# Patient Record
Sex: FEMALE | Race: WHITE | URBAN - METROPOLITAN AREA
[De-identification: names, ages, dates, MRNs, and addresses within clinical notes are randomized per-mention and may not be internally consistent; named-entity substitution may affect disease eponyms.]

---

## 2017-01-09 ENCOUNTER — HISTORICAL (OUTPATIENT)
Dept: LAB | Facility: HOSPITAL | Age: 22
End: 2017-01-09

## 2018-03-12 ENCOUNTER — HISTORICAL (OUTPATIENT)
Dept: ADMINISTRATIVE | Facility: HOSPITAL | Age: 23
End: 2018-03-12

## 2018-05-09 ENCOUNTER — HISTORICAL (OUTPATIENT)
Dept: ADMINISTRATIVE | Facility: HOSPITAL | Age: 23
End: 2018-05-09

## 2018-07-18 ENCOUNTER — HOSPITAL ENCOUNTER (OUTPATIENT)
Dept: OBSTETRICS AND GYNECOLOGY | Facility: HOSPITAL | Age: 23
End: 2018-07-18
Attending: OBSTETRICS & GYNECOLOGY | Admitting: OBSTETRICS & GYNECOLOGY

## 2018-07-25 ENCOUNTER — HISTORICAL (OUTPATIENT)
Dept: LAB | Facility: HOSPITAL | Age: 23
End: 2018-07-25

## 2020-07-30 ENCOUNTER — HISTORICAL (OUTPATIENT)
Dept: ADMINISTRATIVE | Facility: HOSPITAL | Age: 25
End: 2020-07-30

## 2024-06-24 LAB
ABO AND RH: NORMAL
ANTIBODY SCREEN: NEGATIVE
C TRACH DNA SPEC QL NAA+PROBE: NEGATIVE
HBV SURFACE AG SERPL QL IA: NORMAL
HCV AB SERPL QL IA: NEGATIVE
HIV 1+2 AB+HIV1 P24 AG SERPL QL IA: NORMAL
N GONORRHOEA DNA SPEC QL NAA+PROBE: NEGATIVE
T PALLIDUM AB SER QL: NONREACTIVE
TREPONEMA PALLIDUM ANTIBODIES (IGG, IGM): NEGATIVE

## 2024-07-23 ENCOUNTER — HOSPITAL ENCOUNTER (OUTPATIENT)
Dept: RADIOLOGY | Facility: HOSPITAL | Age: 29
Discharge: HOME | End: 2024-07-23
Attending: INTERNAL MEDICINE
Payer: MEDICAID

## 2024-07-23 DIAGNOSIS — E04.1 THYROID NODULE: Primary | ICD-10-CM

## 2024-07-23 DIAGNOSIS — E04.1 THYROID NODULE: ICD-10-CM

## 2024-07-23 PROCEDURE — 76536 US EXAM OF HEAD AND NECK: CPT | Mod: TC

## 2024-09-09 ENCOUNTER — HOSPITAL ENCOUNTER (EMERGENCY)
Facility: HOSPITAL | Age: 29
Discharge: HOME | End: 2024-09-09
Attending: OBSTETRICS & GYNECOLOGY
Payer: MEDICAID

## 2024-09-09 VITALS
DIASTOLIC BLOOD PRESSURE: 75 MMHG | TEMPERATURE: 99 F | OXYGEN SATURATION: 98 % | BODY MASS INDEX: 24.55 KG/M2 | SYSTOLIC BLOOD PRESSURE: 122 MMHG | HEART RATE: 109 BPM | WEIGHT: 130 LBS | RESPIRATION RATE: 18 BRPM | HEIGHT: 61 IN

## 2024-09-09 PROCEDURE — 99284 EMERGENCY DEPT VISIT MOD MDM: CPT

## 2024-09-09 NOTE — ED PROVIDER NOTES
"SAUD NOTE     Admit Date: 2024  SAUD Physician: Leonid Granados  Primary OBGYN: Dr. Buster Knott    Admit Diagnosis/Chief Complaint: Abdominal Pain      Chief Complaint   Patient presents with    Abdominal Pain     Abdominal pain starting last night around 7:30pm. Pt states pain comes and goes.       Hospital Course:  Felicitas Davis is a 29 y.o.  at 33w3d presents complaining of contractions      Patient states has been complicated by GERD in this pregnancy.     Patient denies vaginal bleeding, leakage of fluid, and contractions.  Fetal Movement: normal.    /75   Pulse 109   Temp 98.6 °F (37 °C)   Resp 18   Ht 5' 1" (1.549 m)   Wt 59 kg (130 lb)   SpO2 98%   BMI 24.56 kg/m²   Temp:  [98.6 °F (37 °C)] 98.6 °F (37 °C)  Pulse:  [109-117] 109  Resp:  [18] 18  SpO2:  [98 %] 98 %  BP: (122-135)/(72-75) 122/75    General: in no apparent distress  Abdominal: soft, nontender, nondistended, no abnormal masses, no epigastric pain FHT present  Back: lumbar tenderness absent   CVA tenderness none  Extremeties no redness or tenderness in the calves or thighs no edema    SSE:   SVE:      FHT:  Reactive  TOCO: Contractions none      LABS:   No results found for this or any previous visit (from the past 24 hour(s)).  [unfilled]     Imaging Results    None          ASSESMENT: Felicitas Davis is a 29 y.o.   at 33w3d with Contractions  Observation in SAUD  Rule out labor  We will reevaluate cervix if [place]  If no contractions or cervical change, will discharge from hospital  Labor precautions reviewed with patient  ER precautions reviewed with patient  Patient was given an opportunity to ask questions  She is to follow-up with her primary care physician        Discharge Diagnosis/Clinical Impression**: Rule Out Labor, Contractions in Pregnancy, False Labor  Status:Stable    Patient Instructions:       - Pt was given routine pregnancy instructions including to " return to triage if she had any vaginal bleeding (other than spotting for the next 48hrs), any loss of fluid like her water broke, decreased fetal movement, or contractions Q 5min lasting for 2 or more hours. Pt was also instructed to drink copious water. Patient voiced understanding of all these instructions and was subsequently discharged home.    She will follow up with her primary OB.      This note was created with the assistance of [employer] voice recognition software. There may be transcription errors as a result of using this technology however minimal. Effort has been made to assure accuracy of transcription but any obvious errors or omissions should be clarified with the author of the document.

## 2024-10-02 LAB — STREP B PCR (OHS): NEGATIVE

## 2024-10-21 ENCOUNTER — ANESTHESIA (OUTPATIENT)
Dept: OBSTETRICS AND GYNECOLOGY | Facility: HOSPITAL | Age: 29
End: 2024-10-21
Payer: MEDICAID

## 2024-10-21 ENCOUNTER — HOSPITAL ENCOUNTER (INPATIENT)
Facility: HOSPITAL | Age: 29
LOS: 2 days | Discharge: HOME | End: 2024-10-23
Attending: OBSTETRICS & GYNECOLOGY | Admitting: OBSTETRICS & GYNECOLOGY
Payer: MEDICAID

## 2024-10-21 ENCOUNTER — ANESTHESIA EVENT (OUTPATIENT)
Dept: OBSTETRICS AND GYNECOLOGY | Facility: HOSPITAL | Age: 29
End: 2024-10-21
Payer: MEDICAID

## 2024-10-21 DIAGNOSIS — Z37.9 NORMAL LABOR: Primary | ICD-10-CM

## 2024-10-21 LAB
BASOPHILS # BLD AUTO: 0.06 X10(3)/MCL
BASOPHILS NFR BLD AUTO: 0.4 %
EOSINOPHIL # BLD AUTO: 0.22 X10(3)/MCL (ref 0–0.9)
EOSINOPHIL NFR BLD AUTO: 1.4 %
ERYTHROCYTE [DISTWIDTH] IN BLOOD BY AUTOMATED COUNT: 14.4 % (ref 11.5–17)
GROUP & RH: NORMAL
HCT VFR BLD AUTO: 38.3 % (ref 37–47)
HGB BLD-MCNC: 13.5 G/DL (ref 12–16)
IMM GRANULOCYTES # BLD AUTO: 0.07 X10(3)/MCL (ref 0–0.04)
IMM GRANULOCYTES NFR BLD AUTO: 0.4 %
INDIRECT COOMBS: NORMAL
LYMPHOCYTES # BLD AUTO: 3.2 X10(3)/MCL (ref 0.6–4.6)
LYMPHOCYTES NFR BLD AUTO: 20.1 %
MCH RBC QN AUTO: 30.5 PG (ref 27–31)
MCHC RBC AUTO-ENTMCNC: 35.2 G/DL (ref 33–36)
MCV RBC AUTO: 86.7 FL (ref 80–94)
MONOCYTES # BLD AUTO: 0.94 X10(3)/MCL (ref 0.1–1.3)
MONOCYTES NFR BLD AUTO: 5.9 %
NEUTROPHILS # BLD AUTO: 11.43 X10(3)/MCL (ref 2.1–9.2)
NEUTROPHILS NFR BLD AUTO: 71.8 %
NRBC BLD AUTO-RTO: 0 %
PLATELET # BLD AUTO: 223 X10(3)/MCL (ref 130–400)
PMV BLD AUTO: 10.7 FL (ref 7.4–10.4)
RBC # BLD AUTO: 4.42 X10(6)/MCL (ref 4.2–5.4)
SPECIMEN OUTDATE: NORMAL
T PALLIDUM AB SER QL: NONREACTIVE
WBC # BLD AUTO: 15.92 X10(3)/MCL (ref 4.5–11.5)

## 2024-10-21 PROCEDURE — 99285 EMERGENCY DEPT VISIT HI MDM: CPT | Mod: 25

## 2024-10-21 PROCEDURE — 85025 COMPLETE CBC W/AUTO DIFF WBC: CPT | Performed by: OBSTETRICS & GYNECOLOGY

## 2024-10-21 PROCEDURE — 37000008 HC ANESTHESIA 1ST 15 MINUTES: Performed by: ANESTHESIOLOGY

## 2024-10-21 PROCEDURE — 51702 INSERT TEMP BLADDER CATH: CPT

## 2024-10-21 PROCEDURE — 72100002 HC LABOR CARE, 1ST 8 HOURS

## 2024-10-21 PROCEDURE — 86850 RBC ANTIBODY SCREEN: CPT | Performed by: OBSTETRICS & GYNECOLOGY

## 2024-10-21 PROCEDURE — 63600175 PHARM REV CODE 636 W HCPCS: Mod: JG

## 2024-10-21 PROCEDURE — 86780 TREPONEMA PALLIDUM: CPT | Performed by: OBSTETRICS & GYNECOLOGY

## 2024-10-21 PROCEDURE — 11000001 HC ACUTE MED/SURG PRIVATE ROOM

## 2024-10-21 PROCEDURE — 25000003 PHARM REV CODE 250

## 2024-10-21 PROCEDURE — 63600175 PHARM REV CODE 636 W HCPCS

## 2024-10-21 PROCEDURE — 62326 NJX INTERLAMINAR LMBR/SAC: CPT

## 2024-10-21 PROCEDURE — 72200005 HC VAGINAL DELIVERY LEVEL II

## 2024-10-21 PROCEDURE — 63600175 PHARM REV CODE 636 W HCPCS: Performed by: OBSTETRICS & GYNECOLOGY

## 2024-10-21 PROCEDURE — 37000009 HC ANESTHESIA EA ADD 15 MINS: Performed by: ANESTHESIOLOGY

## 2024-10-21 RX ORDER — SIMETHICONE 80 MG
1 TABLET,CHEWABLE ORAL EVERY 6 HOURS PRN
Status: DISCONTINUED | OUTPATIENT
Start: 2024-10-21 | End: 2024-10-23 | Stop reason: HOSPADM

## 2024-10-21 RX ORDER — DIPHENOXYLATE HYDROCHLORIDE AND ATROPINE SULFATE 2.5; .025 MG/1; MG/1
2 TABLET ORAL EVERY 6 HOURS PRN
Status: DISCONTINUED | OUTPATIENT
Start: 2024-10-21 | End: 2024-10-23 | Stop reason: HOSPADM

## 2024-10-21 RX ORDER — OXYTOCIN 10 [USP'U]/ML
10 INJECTION, SOLUTION INTRAMUSCULAR; INTRAVENOUS ONCE AS NEEDED
Status: DISCONTINUED | OUTPATIENT
Start: 2024-10-21 | End: 2024-10-23 | Stop reason: HOSPADM

## 2024-10-21 RX ORDER — FENTANYL/BUPIVACAINE/NS/PF 2-1250MCG
PLASTIC BAG, INJECTION (ML) INJECTION CONTINUOUS
Status: DISCONTINUED | OUTPATIENT
Start: 2024-10-21 | End: 2024-10-23 | Stop reason: HOSPADM

## 2024-10-21 RX ORDER — OXYTOCIN-SODIUM CHLORIDE 0.9% IV SOLN 30 UNIT/500ML 30-0.9/5 UT/ML-%
95 SOLUTION INTRAVENOUS CONTINUOUS PRN
Status: DISCONTINUED | OUTPATIENT
Start: 2024-10-21 | End: 2024-10-22

## 2024-10-21 RX ORDER — LIDOCAINE HYDROCHLORIDE AND EPINEPHRINE 15; 5 MG/ML; UG/ML
INJECTION, SOLUTION EPIDURAL
Status: DISCONTINUED | OUTPATIENT
Start: 2024-10-21 | End: 2024-10-21

## 2024-10-21 RX ORDER — MISOPROSTOL 100 UG/1
800 TABLET ORAL ONCE AS NEEDED
Status: DISCONTINUED | OUTPATIENT
Start: 2024-10-21 | End: 2024-10-23 | Stop reason: HOSPADM

## 2024-10-21 RX ORDER — SODIUM CHLORIDE, SODIUM LACTATE, POTASSIUM CHLORIDE, CALCIUM CHLORIDE 600; 310; 30; 20 MG/100ML; MG/100ML; MG/100ML; MG/100ML
INJECTION, SOLUTION INTRAVENOUS CONTINUOUS
Status: DISCONTINUED | OUTPATIENT
Start: 2024-10-21 | End: 2024-10-21

## 2024-10-21 RX ORDER — NALOXONE HCL 0.4 MG/ML
0.4 VIAL (ML) INJECTION SEE ADMIN INSTRUCTIONS
Status: DISCONTINUED | OUTPATIENT
Start: 2024-10-21 | End: 2024-10-22

## 2024-10-21 RX ORDER — ONDANSETRON 4 MG/1
8 TABLET, ORALLY DISINTEGRATING ORAL EVERY 8 HOURS PRN
Status: DISCONTINUED | OUTPATIENT
Start: 2024-10-21 | End: 2024-10-21

## 2024-10-21 RX ORDER — PRENATAL WITH FERROUS FUM AND FOLIC ACID 3080; 920; 120; 400; 22; 1.84; 3; 20; 10; 1; 12; 200; 27; 25; 2 [IU]/1; [IU]/1; MG/1; [IU]/1; MG/1; MG/1; MG/1; MG/1; MG/1; MG/1; UG/1; MG/1; MG/1; MG/1; MG/1
1 TABLET ORAL DAILY
Status: DISCONTINUED | OUTPATIENT
Start: 2024-10-22 | End: 2024-10-23 | Stop reason: HOSPADM

## 2024-10-21 RX ORDER — CARBOPROST TROMETHAMINE 250 UG/ML
250 INJECTION, SOLUTION INTRAMUSCULAR
Status: DISCONTINUED | OUTPATIENT
Start: 2024-10-21 | End: 2024-10-23 | Stop reason: HOSPADM

## 2024-10-21 RX ORDER — BUPIVACAINE HYDROCHLORIDE 2.5 MG/ML
INJECTION, SOLUTION INFILTRATION; PERINEURAL
Status: DISCONTINUED | OUTPATIENT
Start: 2024-10-21 | End: 2024-10-21

## 2024-10-21 RX ORDER — HYDROCORTISONE 25 MG/G
CREAM TOPICAL 3 TIMES DAILY PRN
Status: DISCONTINUED | OUTPATIENT
Start: 2024-10-21 | End: 2024-10-23 | Stop reason: HOSPADM

## 2024-10-21 RX ORDER — FENTANYL/BUPIVACAINE/NS/PF 2-1250MCG
PLASTIC BAG, INJECTION (ML) INJECTION CONTINUOUS PRN
Status: DISCONTINUED | OUTPATIENT
Start: 2024-10-21 | End: 2024-10-21

## 2024-10-21 RX ORDER — OXYTOCIN-SODIUM CHLORIDE 0.9% IV SOLN 30 UNIT/500ML 30-0.9/5 UT/ML-%
95 SOLUTION INTRAVENOUS ONCE AS NEEDED
Status: DISCONTINUED | OUTPATIENT
Start: 2024-10-21 | End: 2024-10-23 | Stop reason: HOSPADM

## 2024-10-21 RX ORDER — OXYTOCIN-SODIUM CHLORIDE 0.9% IV SOLN 30 UNIT/500ML 30-0.9/5 UT/ML-%
95 SOLUTION INTRAVENOUS ONCE AS NEEDED
Status: DISCONTINUED | OUTPATIENT
Start: 2024-10-21 | End: 2024-10-21

## 2024-10-21 RX ORDER — SODIUM CITRATE AND CITRIC ACID MONOHYDRATE 334; 500 MG/5ML; MG/5ML
30 SOLUTION ORAL ONCE
OUTPATIENT
Start: 2024-10-21 | End: 2024-10-21

## 2024-10-21 RX ORDER — FENTANYL CITRATE 50 UG/ML
INJECTION, SOLUTION INTRAMUSCULAR; INTRAVENOUS
Status: DISCONTINUED | OUTPATIENT
Start: 2024-10-21 | End: 2024-10-21

## 2024-10-21 RX ORDER — FAMOTIDINE 10 MG/ML
20 INJECTION INTRAVENOUS
Status: DISCONTINUED | OUTPATIENT
Start: 2024-10-21 | End: 2024-10-21

## 2024-10-21 RX ORDER — EPHEDRINE SULFATE 50 MG/ML
10 INJECTION, SOLUTION INTRAVENOUS
OUTPATIENT
Start: 2024-10-21 | End: 2024-10-21

## 2024-10-21 RX ORDER — METHYLERGONOVINE MALEATE 0.2 MG/ML
200 INJECTION INTRAVENOUS ONCE AS NEEDED
Status: DISCONTINUED | OUTPATIENT
Start: 2024-10-21 | End: 2024-10-21

## 2024-10-21 RX ORDER — CALCIUM CARBONATE 200(500)MG
500 TABLET,CHEWABLE ORAL 3 TIMES DAILY PRN
Status: DISCONTINUED | OUTPATIENT
Start: 2024-10-21 | End: 2024-10-23 | Stop reason: HOSPADM

## 2024-10-21 RX ORDER — EPHEDRINE SULFATE 50 MG/ML
10 INJECTION, SOLUTION INTRAVENOUS EVERY 5 MIN PRN
Status: DISCONTINUED | OUTPATIENT
Start: 2024-10-21 | End: 2024-10-22

## 2024-10-21 RX ORDER — OXYTOCIN-SODIUM CHLORIDE 0.9% IV SOLN 30 UNIT/500ML 30-0.9/5 UT/ML-%
95 SOLUTION INTRAVENOUS CONTINUOUS PRN
Status: DISCONTINUED | OUTPATIENT
Start: 2024-10-21 | End: 2024-10-23 | Stop reason: HOSPADM

## 2024-10-21 RX ORDER — ACETAMINOPHEN 325 MG/1
650 TABLET ORAL EVERY 6 HOURS SCHEDULED
Status: DISCONTINUED | OUTPATIENT
Start: 2024-10-22 | End: 2024-10-23 | Stop reason: HOSPADM

## 2024-10-21 RX ORDER — PROPRANOLOL HYDROCHLORIDE 60 MG/1
60 TABLET ORAL
COMMUNITY
Start: 2024-10-01

## 2024-10-21 RX ORDER — DOCUSATE SODIUM 100 MG/1
200 CAPSULE, LIQUID FILLED ORAL 2 TIMES DAILY PRN
Status: DISCONTINUED | OUTPATIENT
Start: 2024-10-21 | End: 2024-10-23 | Stop reason: HOSPADM

## 2024-10-21 RX ORDER — SIMETHICONE 80 MG
1 TABLET,CHEWABLE ORAL 4 TIMES DAILY PRN
Status: DISCONTINUED | OUTPATIENT
Start: 2024-10-21 | End: 2024-10-21

## 2024-10-21 RX ORDER — MISOPROSTOL 100 UG/1
800 TABLET ORAL ONCE AS NEEDED
Status: DISCONTINUED | OUTPATIENT
Start: 2024-10-21 | End: 2024-10-21

## 2024-10-21 RX ORDER — LIDOCAINE HYDROCHLORIDE 10 MG/ML
10 INJECTION, SOLUTION INFILTRATION; PERINEURAL ONCE AS NEEDED
Status: DISCONTINUED | OUTPATIENT
Start: 2024-10-21 | End: 2024-10-21

## 2024-10-21 RX ORDER — SODIUM CHLORIDE 0.9 % (FLUSH) 0.9 %
10 SYRINGE (ML) INJECTION
Status: DISCONTINUED | OUTPATIENT
Start: 2024-10-21 | End: 2024-10-23 | Stop reason: HOSPADM

## 2024-10-21 RX ORDER — SODIUM CITRATE AND CITRIC ACID MONOHYDRATE 334; 500 MG/5ML; MG/5ML
30 SOLUTION ORAL
Status: DISCONTINUED | OUTPATIENT
Start: 2024-10-21 | End: 2024-10-21

## 2024-10-21 RX ORDER — OXYTOCIN-SODIUM CHLORIDE 0.9% IV SOLN 30 UNIT/500ML 30-0.9/5 UT/ML-%
10 SOLUTION INTRAVENOUS ONCE AS NEEDED
Status: DISCONTINUED | OUTPATIENT
Start: 2024-10-21 | End: 2024-10-21

## 2024-10-21 RX ORDER — CARBOPROST TROMETHAMINE 250 UG/ML
250 INJECTION, SOLUTION INTRAMUSCULAR
Status: DISCONTINUED | OUTPATIENT
Start: 2024-10-21 | End: 2024-10-21

## 2024-10-21 RX ORDER — OXYTOCIN-SODIUM CHLORIDE 0.9% IV SOLN 30 UNIT/500ML 30-0.9/5 UT/ML-%
30 SOLUTION INTRAVENOUS ONCE AS NEEDED
Status: DISCONTINUED | OUTPATIENT
Start: 2024-10-21 | End: 2024-10-21

## 2024-10-21 RX ORDER — METHYLERGONOVINE MALEATE 0.2 MG/ML
200 INJECTION INTRAVENOUS ONCE AS NEEDED
Status: DISCONTINUED | OUTPATIENT
Start: 2024-10-21 | End: 2024-10-23 | Stop reason: HOSPADM

## 2024-10-21 RX ORDER — MUPIROCIN 20 MG/G
OINTMENT TOPICAL
OUTPATIENT
Start: 2024-10-21

## 2024-10-21 RX ORDER — DIPHENHYDRAMINE HYDROCHLORIDE 50 MG/ML
25 INJECTION INTRAMUSCULAR; INTRAVENOUS EVERY 4 HOURS PRN
Status: DISCONTINUED | OUTPATIENT
Start: 2024-10-21 | End: 2024-10-23 | Stop reason: HOSPADM

## 2024-10-21 RX ORDER — DIPHENOXYLATE HYDROCHLORIDE AND ATROPINE SULFATE 2.5; .025 MG/1; MG/1
2 TABLET ORAL EVERY 6 HOURS PRN
Status: DISCONTINUED | OUTPATIENT
Start: 2024-10-21 | End: 2024-10-21

## 2024-10-21 RX ORDER — OXYTOCIN-SODIUM CHLORIDE 0.9% IV SOLN 30 UNIT/500ML 30-0.9/5 UT/ML-%
10 SOLUTION INTRAVENOUS ONCE AS NEEDED
OUTPATIENT
Start: 2024-10-21 | End: 2036-03-19

## 2024-10-21 RX ORDER — ONDANSETRON 4 MG/1
8 TABLET, ORALLY DISINTEGRATING ORAL EVERY 8 HOURS PRN
Status: DISCONTINUED | OUTPATIENT
Start: 2024-10-21 | End: 2024-10-23 | Stop reason: HOSPADM

## 2024-10-21 RX ORDER — EPHEDRINE SULFATE 50 MG/ML
25 INJECTION, SOLUTION INTRAVENOUS EVERY 30 MIN PRN
Status: DISCONTINUED | OUTPATIENT
Start: 2024-10-21 | End: 2024-10-23 | Stop reason: HOSPADM

## 2024-10-21 RX ORDER — FENTANYL/BUPIVACAINE/NS/PF 2-1250MCG
PLASTIC BAG, INJECTION (ML) INJECTION CONTINUOUS
Status: DISCONTINUED | OUTPATIENT
Start: 2024-10-21 | End: 2024-10-21

## 2024-10-21 RX ORDER — DIPHENHYDRAMINE HCL 25 MG
25 CAPSULE ORAL EVERY 4 HOURS PRN
Status: DISCONTINUED | OUTPATIENT
Start: 2024-10-21 | End: 2024-10-23 | Stop reason: HOSPADM

## 2024-10-21 RX ORDER — OXYTOCIN 10 [USP'U]/ML
10 INJECTION, SOLUTION INTRAMUSCULAR; INTRAVENOUS ONCE AS NEEDED
Status: COMPLETED | OUTPATIENT
Start: 2024-10-21 | End: 2024-10-21

## 2024-10-21 RX ORDER — DIPHENHYDRAMINE HYDROCHLORIDE 50 MG/ML
12.5 INJECTION INTRAMUSCULAR; INTRAVENOUS EVERY 4 HOURS PRN
Status: DISCONTINUED | OUTPATIENT
Start: 2024-10-21 | End: 2024-10-21

## 2024-10-21 RX ORDER — ONDANSETRON HYDROCHLORIDE 2 MG/ML
4 INJECTION, SOLUTION INTRAVENOUS EVERY 4 HOURS PRN
Status: DISCONTINUED | OUTPATIENT
Start: 2024-10-21 | End: 2024-10-22

## 2024-10-21 RX ADMIN — ACETAMINOPHEN 650 MG: 325 TABLET, FILM COATED ORAL at 10:10

## 2024-10-21 RX ADMIN — FENTANYL CITRATE 100 MCG: 50 INJECTION, SOLUTION INTRAMUSCULAR; INTRAVENOUS at 07:10

## 2024-10-21 RX ADMIN — OXYTOCIN 10 UNITS: 10 INJECTION, SOLUTION INTRAMUSCULAR; INTRAVENOUS at 08:10

## 2024-10-21 RX ADMIN — LIDOCAINE HYDROCHLORIDE,EPINEPHRINE BITARTRATE 3 ML: 15; .005 INJECTION, SOLUTION EPIDURAL; INFILTRATION; INTRACAUDAL; PERINEURAL at 07:10

## 2024-10-21 RX ADMIN — SODIUM CHLORIDE, POTASSIUM CHLORIDE, SODIUM LACTATE AND CALCIUM CHLORIDE 500 ML: 600; 310; 30; 20 INJECTION, SOLUTION INTRAVENOUS at 07:10

## 2024-10-21 RX ADMIN — BUPIVACAINE HYDROCHLORIDE 4 ML: 2.5 INJECTION, SOLUTION INFILTRATION; PERINEURAL at 07:10

## 2024-10-21 RX ADMIN — Medication 12 ML/HR: at 07:10

## 2024-10-21 NOTE — ANESTHESIA PREPROCEDURE EVALUATION
"[facility] - Labor and Delivery  Anesthesiology  Labor Epidural    Patient Name: Felicitas Davis  MRN: [id number]  Admission Date: 10/21/2024  Primary Care Provider: Shirley, Primary Doctor  Attending Physician: Buster Knott Jr., MD    Subjective:     Patient in labor, requesting epidural.    OB History    Para Term  AB Living   4 3 3 0 0 3   SAB IAB Ectopic Multiple Live Births   0 0 0 0 3      # Outcome Date GA Lbr Fareed/2nd Weight Sex Type Anes PTL Lv   4 Current            3 Term 18 39w0d    Vag-Spont   NATHAN   2 Term 03/18/15 41w0d    Vag-Spont   NATHAN   1 Term 12 40w0d    Vag-Spont   NATHAN     History reviewed. No pertinent past medical history.  History reviewed. No pertinent surgical history.    PTA Medications   Medication Sig    propranoloL (INDERAL) 60 MG tablet Take 60 mg by mouth.    prenatal vit/iron fum/folic ac (PRENATAL 1+1 ORAL) Take by mouth.       Review of patient's allergies indicates:   Allergen Reactions    Codeine Hives        Tobacco Use    Smoking status: Never    Smokeless tobacco: Never   Substance and Sexual Activity    Alcohol use: Not Currently    Drug use: Never    Sexual activity: Not on file     Review of Systems   Objective:     Vital Signs (Most Recent):  Pulse: 79 (10/21/24 183)  Resp: 18 (10/21/24 1837)  BP: 115/73 (10/21/24 1837)  SpO2: 100 % (10/21/24 1837) Vital Signs (24h Range):  Pulse:  [79] 79  Resp:  [18] 18  SpO2:  [100 %] 100 %  BP: (115)/(73) 115/73     Weight: 59.9 kg (132 lb)  Body mass index is 24.94 kg/m².    Significant Labs:  No results found for: "WBC", "HGB", "HCT", "MCV", "PLT"    Assessment/Plan:     29 y.o. female  at 39w3d for: Labor epidural    Yoshi Reynoso, LONNIE  Anesthesiology  [facility] - Labor and Delivery         Pre-op Assessment    I have reviewed the Patient Summary Reports.     I have reviewed the Nursing Notes. I have reviewed the NPO Status.   I have reviewed the Medications. "     Review of Systems  Anesthesia Hx:             Denies Family Hx of Anesthesia complications.    Denies Personal Hx of Anesthesia complications.                    Hematology/Oncology:  Hematology Normal   Oncology Normal                                   EENT/Dental:  EENT/Dental Normal           Cardiovascular:  Cardiovascular Normal                                              Pulmonary:  Pulmonary Normal                       Renal/:  Renal/ Normal                 Hepatic/GI:  Hepatic/GI Normal                    Musculoskeletal:  Musculoskeletal Normal                Neurological:  Neurology Normal                                      Endocrine:  Endocrine Normal            Dermatological:  Skin Normal    Psych:  Psychiatric Normal                    Physical Exam  General: Well nourished, Cooperative, Oriented and Alert    Airway:  Mallampati: II   Mouth Opening: Normal  TM Distance: Normal  Tongue: Normal  Neck ROM: Normal ROM        Anesthesia Plan  Type of Anesthesia, risks & benefits discussed:    Anesthesia Type: Epidural  Informed Consent: Informed consent signed with the Patient and all parties understand the risks and agree with anesthesia plan.  All questions answered.   ASA Score: 2  Day of Surgery Review of History & Physical: H&P Update referred to the surgeon/provider.    Ready For Surgery From Anesthesia Perspective.     .

## 2024-10-21 NOTE — ED PROVIDER NOTES
Encounter Date: 10/21/2024       History     Chief Complaint   Patient presents with    Contractions      IUP @ 39.3wks, c/o irregular ctx, more frequent the last hour. Pt denies LOF, vaginal bleeding. +FM. RAUL Knott is primary OB     HPI  Review of patient's allergies indicates:   Allergen Reactions    Codeine Hives     History reviewed. No pertinent past medical history.  History reviewed. No pertinent surgical history.  No family history on file.  Social History     Tobacco Use    Smoking status: Never    Smokeless tobacco: Never   Substance Use Topics    Alcohol use: Not Currently    Drug use: Never     Review of Systems    Physical Exam     Initial Vitals [10/21/24 1837]   BP Pulse Resp Temp SpO2   115/73 79 18 -- --      MAP       --         Physical Exam    ED Course   Procedures  Labs Reviewed - No data to display       Imaging Results    None          Medications - No data to display  Medical Decision Making                                    Clinical Impression:   This  @ 38 + weeks gestation presents with complaints of pain and contractions since 4pm. Reports +FM, no LOF, no UB, +CTX  Denies any complications this pregnancy  And no pertinent medical history    Tracing: cat 2 with mild variables with full recovery lasting less than 30sec    VE: /-2    A/P: Labor, progressing, category 2 tracing  -admit  -pain management  -IV fluids  -labs  -discussed with Dr. Knott      A/P:           Rafael Palomino MD  10/21/24 2539

## 2024-10-22 ENCOUNTER — ANESTHESIA EVENT (OUTPATIENT)
Dept: OBSTETRICS AND GYNECOLOGY | Facility: HOSPITAL | Age: 29
End: 2024-10-22

## 2024-10-22 ENCOUNTER — ANESTHESIA (OUTPATIENT)
Dept: OBSTETRICS AND GYNECOLOGY | Facility: HOSPITAL | Age: 29
End: 2024-10-22

## 2024-10-22 PROCEDURE — 72200006 HC VAGINAL DELIVERY LEVEL III

## 2024-10-22 PROCEDURE — 11000001 HC ACUTE MED/SURG PRIVATE ROOM

## 2024-10-22 PROCEDURE — 25000003 PHARM REV CODE 250: Performed by: OBSTETRICS & GYNECOLOGY

## 2024-10-22 RX ORDER — OXYCODONE AND ACETAMINOPHEN 5; 325 MG/1; MG/1
1 TABLET ORAL EVERY 6 HOURS PRN
Status: DISCONTINUED | OUTPATIENT
Start: 2024-10-22 | End: 2024-10-23 | Stop reason: HOSPADM

## 2024-10-22 RX ORDER — IBUPROFEN 600 MG/1
600 TABLET ORAL EVERY 6 HOURS SCHEDULED
Status: DISCONTINUED | OUTPATIENT
Start: 2024-10-22 | End: 2024-10-23 | Stop reason: HOSPADM

## 2024-10-22 RX ORDER — OXYCODONE AND ACETAMINOPHEN 5; 325 MG/1; MG/1
1 TABLET ORAL EVERY 6 HOURS PRN
Qty: 20 TABLET | Refills: 0 | Status: SHIPPED | OUTPATIENT
Start: 2024-10-22

## 2024-10-22 RX ADMIN — OXYCODONE HYDROCHLORIDE AND ACETAMINOPHEN 1 TABLET: 5; 325 TABLET ORAL at 03:10

## 2024-10-22 RX ADMIN — OXYCODONE HYDROCHLORIDE AND ACETAMINOPHEN 1 TABLET: 5; 325 TABLET ORAL at 10:10

## 2024-10-22 RX ADMIN — DOCUSATE SODIUM 200 MG: 100 CAPSULE, LIQUID FILLED ORAL at 07:10

## 2024-10-22 RX ADMIN — OXYCODONE HYDROCHLORIDE AND ACETAMINOPHEN 1 TABLET: 5; 325 TABLET ORAL at 09:10

## 2024-10-22 RX ADMIN — PRENATAL VITAMINS-IRON FUMARATE 27 MG IRON-FOLIC ACID 0.8 MG TABLET 1 TABLET: at 07:10

## 2024-10-22 RX ADMIN — IBUPROFEN 600 MG: 600 TABLET, FILM COATED ORAL at 07:10

## 2024-10-22 RX ADMIN — IBUPROFEN 600 MG: 600 TABLET, FILM COATED ORAL at 01:10

## 2024-10-22 RX ADMIN — ACETAMINOPHEN 650 MG: 325 TABLET, FILM COATED ORAL at 03:10

## 2024-10-22 NOTE — ANESTHESIA PROCEDURE NOTES
Epidural    Patient location during procedure: OB   Reason for block: primary anesthetic   Reason for block: labor analgesia requested by patient and obstetrician  Diagnosis: Labor pain relief   Start time: 10/21/2024 7:20 PM  Timeout: 10/21/2024 7:17 PM  End time: 10/21/2024 7:27 PM    Staffing  Performing Provider: Yoshi Reynoso CRNA  Authorizing Provider: Hoang Mcmullen MD    Staffing  Performed by: Yoshi Reynoso CRNA  Authorized by: Hoang Mcmullen MD        Preanesthetic Checklist  Completed: patient identified, IV checked, site marked, risks and benefits discussed, surgical consent, monitors and equipment checked, pre-op evaluation, timeout performed, anesthesia consent given, hand hygiene performed and patient being monitored  Preparation  Patient position: sitting (Mask and sterile gloves worn)  Prep: ChloraPrep and Pt preloaded with 500 to 1000ml of crystalloid  Patient monitoring: Pulse Ox (Pre & Post procedure vitals & FHR are recorded by the nurse q5mins as appropriate)  Reason for block: primary anesthetic   Epidural  Skin Anesthetic: lidocaine 1% (25G 1.5inch needle)  Skin Wheal: 3 mL  Administration type: continuous  Approach: midline  Interspace: L3-4    Injection technique: GREGORY saline  Needle and Epidural Catheter  Needle type: Tuohy   Needle gauge: 17  Needle length: 3.5 inches  Needle insertion depth: 4.5 cm  Catheter type: springwound and multi-orifice  Catheter size: 19 G  Catheter at skin depth: 13 cm  Insertion Attempts: 1  Test dose: 3 mL of lidocaine 1.5% with Epi 1-to-200,000 (Test dose given via epidural catheter)  Additional Documentation: negative aspiration for heme and CSF, no paresthesia on injection, no significant pain on injection, incremental injection, no signs/symptoms of IV or SA injection and no significant complaints from patient (Epidural catheter connected to epidural pump with filter in situ and pt instructed on its use.  Warning label placed on epidural  catheter.)  Needle localization: anatomical landmarks  Assessment  Ease of block: easy  Patient's tolerance of the procedure: comfortable throughout block (Pt returned to supine position with head of bed elevated 30 degrees and HECTOR applied as needed)  Additional Notes  Pt instructed in use of epidural PCA  Improvement in pain relief documented by L&D nurses  L&D nurses instructed to call if no relief at all after 30 mins No inadvertent dural puncture with Tuohy.  Dural puncture not performed with spinal needle

## 2024-10-22 NOTE — OP NOTE
[facility]                       [address] [address]    PATIENT NAME:      LUCIE CHILDS  YOB: 1995  CSN:               [id number]  MRN:               [id number]  ADMIT DATE:        10/21/2024 18:30:00  PHYSICIAN:         Buster Knott Jr, MD                          OPERATIVE REPORT      DATE OF SURGERY:    10/21/2024 00:00:00    SURGEON:  Buster Knott Jr, MD    TYPE OF DELIVERY:  Spontaneous vaginal delivery.    PROCEDURE IN DETAIL:  A 29-year-old female admitted to the obstetric unit in   active labor.  Upon arrival, the patient is noted to be 4-5 cm.  She had   artificial rupture of membranes, clear fluid.  Received epidural anesthesia and   progressed to complete cervical dilatation with maternal contraction and   Valsalva.  The infant's head was delivered without incident.  Anterior shoulder   was delivered.  Rest of the infant delivered in full.  The cord was clamped and   cut.  Appropriate cord gases and cord blood were obtained.  The infant was   handed off to awaiting [facility] nurses who were at the bedside at the time of   delivery secondary to meconium-stained fluid.  The placenta was delivered   spontaneously.  IV Pitocin drip was begun.  Uterus was massaged and noted to be   firm at the umbilicus.  A vaginal and cervical inspection revealed no   lacerations or tears.  Apgars, weight pending.    BLOOD LOSS:  340.        ______________________________  Buster Knott Jr, MD    DJE/AQS  DD:  10/22/2024  Time:  08:06AM  DT:  10/22/2024  Time:  08:33AM  Job #:  [id number]      OPERATIVE REPORT

## 2024-10-22 NOTE — LACTATION NOTE
This note was copied from a baby's chart.  Mom is exclusively pumping for baby. Assisted mom with pumping. Reviewed use of pump, cleaning kit, milk storage and handling. Encouraged pumping 8 or more times per 24 hrs with at least one time during the night. Explained supply/demand of milk production. Mom was shown hand expression and expressed 0.3mls.     Answered moms questions. Offered further assistance if needed. Verbalized understanding of all.

## 2024-10-22 NOTE — H&P
This  @ 38 + weeks gestation presents with complaints of pain and contractions since 4pm. Reports +FM, no LOF, no UB, +CTX  Denies any complications this pregnancy  And no pertinent medical history     Tracing: cat 2 with mild variables with full recovery lasting less than 30sec     VE: /-2     A/P: Labor, progressing, category 2 tracing  -admit  -pain management  -IV fluids  -labs  -discussed with Dr. Knott

## 2024-10-22 NOTE — PLAN OF CARE
Problem:  Fall Injury Risk  Goal: Absence of Fall, Infant Drop and Related Injury  Outcome: Progressing  Intervention: Identify and Manage Contributors  Flowsheets (Taken 10/21/2024 2050)  Self-Care Promotion: independence encouraged  Medication Review/Management: medications reviewed  Intervention: Prevent  Drop or Fall  Flowsheets (Taken 10/21/2024 2050)  Infant Drop Prevention:   room lighting adjusted   side rails raised for holding   support person presence encouraged     Problem: Infection  Goal: Absence of Infection Signs and Symptoms  Outcome: Progressing     Problem: Labor  Goal: Absence of Infection Signs and Symptoms  Outcome: Progressing  Intervention: Prevent or Manage Infection  Flowsheets (Taken 10/21/2024 2050)  Infection Prevention:   hand hygiene promoted   single patient room provided      Alert-The patient is alert, awake and responds to voice. The patient is oriented to time, place, and person. The triage nurse is able to obtain subjective information.

## 2024-10-22 NOTE — H&P
[facility]                       [address] [place] [address]    PATIENT NAME:       LUCIE CHILDS  YOB: 1995  CSN:                [id number]   MRN:                [id number]  ADMIT DATE:         10/21/2024 18:30:00  PHYSICIAN:          Buster Knott Jr, MD                        HISTORY AND PHYSICAL      HISTORY OF PRESENT ILLNESS:  A 29-year-old,  4, para 3, with pregnancy at   39 weeks 4 days, admitted to the OB ED in active labor.  Upon arrival, the   patient noted to be 5 cm, regular contractions.  She was admitted for delivery.    Refer to OB flow sheet for history.    OBJECTIVE:  Her vitals on admission were stable.  She was afebrile.  Physical   examination is within normal limits.    ASSESSMENT:  Pregnancy at term labor.    PLAN:  Admit for delivery.        ______________________________  Buster Knott Jr, MD    DJE/AQS  DD:  10/22/2024  Time:  08:04AM  DT:  10/22/2024  Time:  08:12AM  Job #:  [id number]      HISTORY AND PHYSICAL

## 2024-10-22 NOTE — ANESTHESIA POSTPROCEDURE EVALUATION
Anesthesia Post Evaluation    Patient: Felicitas Davis    Procedure(s) Performed: * No procedures listed *    Final Anesthesia Type: regional (Regional comprises either epidural or spinal)      Patient location during evaluation: labor & delivery  Patient participation: Yes- Able to Participate  Post-procedure vital signs: reviewed and stable (No complaints at this time)  Pain management: adequate      Anesthetic complications: no                No complaints at this time.      Vitals Value Taken Time   /65 10/22/24 0033   Temp 37.2 °C (99 °F) 10/22/24 0033   Pulse 89 10/22/24 0033   Resp 16 10/21/24 2040   SpO2 96 % 10/22/24 0033         No case tracking events are documented in the log.      Pain/Randy Score: Pain Rating Prior to Med Admin: 3 (10/22/2024  3:47 AM)

## 2024-10-23 VITALS
HEIGHT: 61 IN | TEMPERATURE: 98 F | RESPIRATION RATE: 18 BRPM | OXYGEN SATURATION: 98 % | DIASTOLIC BLOOD PRESSURE: 77 MMHG | WEIGHT: 132 LBS | SYSTOLIC BLOOD PRESSURE: 117 MMHG | BODY MASS INDEX: 24.92 KG/M2 | HEART RATE: 71 BPM

## 2024-10-23 PROCEDURE — 25000003 PHARM REV CODE 250: Performed by: OBSTETRICS & GYNECOLOGY

## 2024-10-23 RX ADMIN — DOCUSATE SODIUM 200 MG: 100 CAPSULE, LIQUID FILLED ORAL at 07:10

## 2024-10-23 RX ADMIN — IBUPROFEN 600 MG: 600 TABLET, FILM COATED ORAL at 07:10

## 2024-10-23 RX ADMIN — PRENATAL VITAMINS-IRON FUMARATE 27 MG IRON-FOLIC ACID 0.8 MG TABLET 1 TABLET: at 07:10

## 2024-10-23 RX ADMIN — IBUPROFEN 600 MG: 600 TABLET, FILM COATED ORAL at 01:10

## 2024-10-23 NOTE — LACTATION NOTE
This note was copied from a baby's chart.  [facility]   [phone]   Discharge Instructions      Feed baby when you notice early hunger cues, such as rooting, hand to mouth, smacking lips, sticking out tongue.  Crying is a late sign of hunger. Try to get baby to the breast before crying begins. (page 2 & 39 of booklet)      Most newborns need to eat at least 8 times in a 24-hour period. Feeding often will help develop milk supply.  Feed baby anytime hunger cues are noticed, and wake baby if needed to ensure 8 or more feeds per 24 hour period. (pg. 24)      Cluster feeding is normal: baby may nurse very often for several times in a row.  This commonly occurs in the evening or early part of the night. (pg. 4)       Allow your baby to finish one side before offering the other. You can try to burp baby and then offer the other breast if he/she seems to still be hungry.       Skin to skin contact can help a sleepy baby want to nurse. Babies held skin to skin, often nurse better and longer. Skin to skin increases moms milk-making hormone levels as well. Skin to skin is calming for mom and baby. (pg. 23)      In the early days, the number of wet and dirty diapers baby has each day can help you know if baby is getting enough to eat.  Refer to your breastfeeding booklet (pgs. 2-12) to see how many wet/dirty diapers baby should be having each day.  Contact babys pediatrician or lactation, if baby is not having enough wet and dirty diapers.      It is best to avoid pacifiers and bottles for the first 4 weeks, while getting breastfeeding established.        Back to work or school:  4 weeks is a good time to start pumping after morning feeds, in order to store milk for baby. Although you may pump before if needed. Week 4 is also a good time to introduce a bottle of pumped milk to baby, if you will be going back to work or school.  This can be done sooner if needed. (Refer to pgs 25-27 for pumping and milk  storage)       When baby is latched deeply to your breast, you should feel a strong tugging or pulling sensation. If your nipples are sore, you may feel mild discomfort with initial latch that eases quickly.  If there is pain, try to adjust the latch. Make sure your baby opens his mouth wide to latch on. Scan the QR code on page 18 for a video on latching.       Listen for swallowing when baby is nursing. This indicates your baby is transferring that milk!      Your milk will increase between days 3-5.  Frequent feeds can help prevent engorgement.      If your breasts begin to get engorged, refer to pages 20 & 21 for tips on management.  Feed often to help keep your breasts comfortable. If baby is not able to remove milk from your breasts, pumping will be needed to relieve breast fullness and build milk supply. If baby is removing milk well from your breasts, but they are still uncomfortably full after, You may need to pump for comfort, meaning just pump enough to relieve the fullness.      No soap or lotions to the nipples except for medical grade lanolin or nipple cream for soreness.        All babies go through growth spurts. The first one is generally around 2-3 weeks. If your baby starts to nurse a lot more than usual, this is likely the reason.  Growth spurts happen every so often, and usually lasts for 3-5 days.      Remember to check the safety of any medications, prescription or non-prescription, and herbals, before you take them.  Your babys pediatrician is the best one to confirm the safety of the medication while you are breastfeeding. You may also the lactation department, or the [facility] at [phone], to check the safety of a medication. (pg 31)      Call with any questions or concerns. Dont wait --ask for help early. Breastfeeding Resources can be found on pages 33-35 of your Breastfeeding Booklet given to you in the hospital.                    Plans to exclusively pump. All questions  answered.

## 2024-10-23 NOTE — DISCHARGE SUMMARY
[facility]                       [address] [address]    PATIENT NAME:       LUCIE CHILDS  YOB: 1995  CSN:                [id number]   MRN:                [id number]  ADMIT DATE:         10/21/2024 18:30:00  PHYSICIAN:          Buster Knott Jr, MD                          DISCHARGE SUMMARY    DATE OF DISCHARGE:      HOSPITAL COURSE:  Patient is status post vaginal delivery without incident.  She   was admitted to the postpartal GYN service where she had an uneventful and   speedy recovery.  She was ambulating, tolerating a regular diet.  Her vitals   were stable.  She was afebrile.  She has had normal bowel and bladder function.    She will be discharged home on postpartum day 2.    CONDITION ON DISCHARGE:  Stable.    DIET:  Regular.    ACTIVITY:  Pelvic rest.    MEDICATIONS:  Percocet p.r.n. and Motrin p.r.n.    FOLLOWUP:  Follow up with Dr. Knott in 3 weeks.        ______________________________  Buster Knott Jr, MD    DJE/AQS  DD:  10/23/2024  Time:  08:20AM  DT:  10/23/2024  Time:  08:27AM  Job #:  [id number]      DISCHARGE SUMMARY

## 2024-10-23 NOTE — PLAN OF CARE
"  Problem:  Fall Injury Risk  Goal: Absence of Fall, Infant Drop and Related Injury  Outcome: Progressing     Problem: Adult Inpatient Plan of Care  Goal: Plan of Care Review  Outcome: Progressing  Goal: Patient-Specific Goal (Individualized)  Description: "I want to pump"  Outcome: Progressing  Goal: Absence of Hospital-Acquired Illness or Injury  Outcome: Progressing  Goal: Optimal Comfort and Wellbeing  Outcome: Progressing  Goal: Readiness for Transition of Care  Outcome: Progressing     Problem: Infection  Goal: Absence of Infection Signs and Symptoms  Outcome: Progressing     Problem: Postpartum (Vaginal Delivery)  Goal: Successful Parent Role Transition  Outcome: Progressing  Goal: Hemostasis  Outcome: Progressing  Goal: Absence of Infection Signs and Symptoms  Outcome: Progressing  Goal: Anesthesia/Sedation Recovery  Outcome: Progressing  Goal: Optimal Pain Control and Function  Outcome: Progressing  Goal: Effective Urinary Elimination  Outcome: Progressing     "

## 2025-08-04 PROBLEM — Z37.9 NORMAL LABOR: Status: RESOLVED | Noted: 2024-10-21 | Resolved: 2025-08-04

## 2025-08-27 ENCOUNTER — ON-DEMAND VIRTUAL (OUTPATIENT)
Dept: URGENT CARE | Facility: CLINIC | Age: 30
End: 2025-08-27
Payer: MEDICAID

## 2025-08-27 DIAGNOSIS — Z02.89 ENCOUNTER TO OBTAIN EXCUSE FROM WORK: Primary | ICD-10-CM

## 2025-08-27 PROCEDURE — 98004 SYNCH AUDIO-VIDEO EST SF 10: CPT | Mod: 95,,, | Performed by: NURSE PRACTITIONER
